# Patient Record
Sex: MALE | URBAN - METROPOLITAN AREA
[De-identification: names, ages, dates, MRNs, and addresses within clinical notes are randomized per-mention and may not be internally consistent; named-entity substitution may affect disease eponyms.]

---

## 2022-01-27 ENCOUNTER — HOSPITAL ENCOUNTER (OUTPATIENT)
Age: 77
Discharge: HOME | End: 2022-01-29

## 2022-01-27 PROCEDURE — 88305 TISSUE EXAM BY PATHOLOGIST: CPT

## 2022-05-26 ENCOUNTER — HOSPITAL ENCOUNTER (OUTPATIENT)
Age: 77
Discharge: HOME | End: 2022-05-28

## 2022-05-26 PROCEDURE — 88305 TISSUE EXAM BY PATHOLOGIST: CPT

## 2022-05-26 PROCEDURE — 88331 PATH CONSLTJ SURG 1 BLK 1SPC: CPT

## 2025-01-17 ENCOUNTER — OFFICE VISIT (OUTPATIENT)
Dept: UROLOGY | Facility: HOSPITAL | Age: 80
End: 2025-01-17
Payer: MEDICARE

## 2025-01-17 DIAGNOSIS — R97.20 ELEVATED PSA: ICD-10-CM

## 2025-01-17 DIAGNOSIS — N40.1 BENIGN PROSTATIC HYPERPLASIA WITH INCOMPLETE BLADDER EMPTYING: Primary | ICD-10-CM

## 2025-01-17 DIAGNOSIS — R39.14 BENIGN PROSTATIC HYPERPLASIA WITH INCOMPLETE BLADDER EMPTYING: Primary | ICD-10-CM

## 2025-01-17 PROCEDURE — 51798 US URINE CAPACITY MEASURE: CPT | Performed by: UROLOGY

## 2025-01-17 PROCEDURE — 1036F TOBACCO NON-USER: CPT | Performed by: UROLOGY

## 2025-01-17 PROCEDURE — G2211 COMPLEX E/M VISIT ADD ON: HCPCS | Performed by: UROLOGY

## 2025-01-17 PROCEDURE — 1159F MED LIST DOCD IN RCRD: CPT | Performed by: UROLOGY

## 2025-01-17 PROCEDURE — 99214 OFFICE O/P EST MOD 30 MIN: CPT | Mod: 25 | Performed by: UROLOGY

## 2025-01-17 PROCEDURE — 99204 OFFICE O/P NEW MOD 45 MIN: CPT | Performed by: UROLOGY

## 2025-01-17 ASSESSMENT — ENCOUNTER SYMPTOMS
DEPRESSION: 0
OCCASIONAL FEELINGS OF UNSTEADINESS: 0
LOSS OF SENSATION IN FEET: 0

## 2025-01-17 ASSESSMENT — PATIENT HEALTH QUESTIONNAIRE - PHQ9
1. LITTLE INTEREST OR PLEASURE IN DOING THINGS: NOT AT ALL
SUM OF ALL RESPONSES TO PHQ9 QUESTIONS 1 AND 2: 0
2. FEELING DOWN, DEPRESSED OR HOPELESS: NOT AT ALL

## 2025-02-13 ENCOUNTER — HOSPITAL ENCOUNTER (OUTPATIENT)
Dept: RADIOLOGY | Facility: CLINIC | Age: 80
Discharge: HOME | End: 2025-02-13
Payer: MEDICARE

## 2025-02-13 DIAGNOSIS — R97.20 ELEVATED PSA: ICD-10-CM

## 2025-02-13 PROCEDURE — 2550000001 HC RX 255 CONTRASTS: Performed by: UROLOGY

## 2025-02-13 PROCEDURE — 76498 UNLISTED MR PROCEDURE: CPT

## 2025-02-13 PROCEDURE — A9575 INJ GADOTERATE MEGLUMI 0.1ML: HCPCS | Performed by: UROLOGY

## 2025-02-13 PROCEDURE — 72197 MRI PELVIS W/O & W/DYE: CPT

## 2025-02-13 RX ORDER — GADOTERATE MEGLUMINE 376.9 MG/ML
0.2 INJECTION INTRAVENOUS
Status: COMPLETED | OUTPATIENT
Start: 2025-02-13 | End: 2025-02-13

## 2025-02-13 RX ADMIN — GADOTERATE MEGLUMINE 18 ML: 376.9 INJECTION INTRAVENOUS at 09:35

## 2025-02-20 ENCOUNTER — APPOINTMENT (OUTPATIENT)
Dept: UROLOGY | Facility: HOSPITAL | Age: 80
End: 2025-02-20
Payer: MEDICARE

## 2025-02-20 DIAGNOSIS — R97.20 ELEVATED PSA: ICD-10-CM

## 2025-02-20 PROCEDURE — 99213 OFFICE O/P EST LOW 20 MIN: CPT | Performed by: UROLOGY

## 2025-02-20 PROCEDURE — G2211 COMPLEX E/M VISIT ADD ON: HCPCS | Performed by: UROLOGY

## 2025-02-20 NOTE — PROGRESS NOTES
HPI    79 y.o. male being seen with the following problem list:    Problem list:  BPH with obstruction  Elevated PSA  ED     11/5/24 PVR over 400cc     Prostate bx 2003 - neg     PSA  10/2024 - 45.1  4/2024 - 41.5  4/2023 - 36.3  4/2022 - 29.7  10/2021 - 32.6  3/2014 -16.2  1/2013 - 9.4     01/17/25 - PVR 239cc. Incomplete emptying. He has been on flomax, finasteride for a few months now. Feels his symptoms improved on medication. Not bothered by his urination.     2/13/25 MR prostate  A PI-RADS 3 lesion in the left lateral anterior base transitional zone. No definite signs of gross extracapsular extension. No evidence of enlarged pelvic lymph nodes.  -Prostate vol: 154.22g. PSAd 0.29 ng/mL/g.     02/20/25 - Seen today over telehealth, performed this visit using real-time telehealth tools, including an audio/video connection between Kyle Chin at home and Jesse Killian MD at [facility]. Consent for telemedicine visit was obtained.   Voiding well, no issues or concerns at this point. Here to review MRI.          Current Medications:  No current outpatient medications on file.     No current facility-administered medications for this visit.        Active Problems:  Kyle Chin is a 79 y.o. male with the following Problems and Medications.  There is no problem list on file for this patient.    No current outpatient medications on file.     No current facility-administered medications for this visit.       PMH:  No past medical history on file.    PSH:  No past surgical history on file.    FMH:  No family history on file.    SHx:  Social History     Tobacco Use    Smoking status: Never    Smokeless tobacco: Never       Allergies:  No Known Allergies      Assessment/Plan  We reviewed his MRI showing PiRADS 3 lesions, indeterminate. No evidence of a rapidly worsening process that would warrant further evaluation at this time in context of his PSA stability, prior bx, age. Challenge is that PSA has been  profoundly elevated, hard to use as a marker to trend. For now, we will repeat PSA in 4 months.     FU in 4 months with PSA prior     Scribe Attestation  By signing my name below, I, Olman Hayes, attest that this documentation has been prepared under the direction and in the presence of Jesse Killian MD.

## 2025-05-20 DIAGNOSIS — R97.20 ELEVATED PSA: ICD-10-CM

## 2025-07-02 LAB — PSA SERPL-MCNC: 18.35 NG/ML

## 2025-07-15 NOTE — PROGRESS NOTES
HPI    80 y.o. male being seen with the following problem list:    Problem list:  BPH with obstruction  Elevated PSA  ED     11/5/24 PVR over 400cc     Prostate bx 2003 - neg     PSA  10/2024 - 45.1  4/2024 - 41.5  4/2023 - 36.3  4/2022 - 29.7  10/2021 - 32.6  3/2014 -16.2  1/2013 - 9.4     01/17/25 - PVR 239cc. Incomplete emptying. He has been on flomax, finasteride for a few months now. Feels his symptoms improved on medication. Not bothered by his urination.      2/13/25 MR prostate  A PI-RADS 3 lesion in the left lateral anterior base transitional zone. No definite signs of gross extracapsular extension. No evidence of enlarged pelvic lymph nodes.  -Prostate vol: 154.22g. PSAd 0.29 ng/mL/g.      02/20/25 - Seen today over telehealth, performed this visit using real-time telehealth tools, including an audio/video connection between Kyle Chin at home and Jesse Killian MD at [facility]. Consent for telemedicine visit was obtained.   Voiding well, no issues or concerns at this point. Here to review MRI.     07/17/25 - Seen today over telehealth, performed this visit using real-time telehealth tools, including an audio/video connection between Kyle Chin at home and Jesse Killian MD at [facility]. Consent for telemedicine visit was obtained.   Urinating well. Here to review PSA      Lab Results   Component Value Date    PSA 18.35 (H) 07/01/2025              Current Medications:  Current Medications[1]     Active Problems:  Kyle Chin is a 80 y.o. male with the following Problems and Medications.  Problem List[2]  Current Medications[3]    PMH:  Medical History[4]    PSH:  Surgical History[5]    FMH:  Family History[6]    SHx:  Social History[7]    Allergies:  RX Allergies[8]      Assessment/Plan  PSA now 18, 36 corrected for finasteride. Similar to levels 2 years prior, though still markedly elevated but he has had a several decade history of ePSA. Recent MRI with just an  indeterminate lesion. Reassuring that PSA dropped appropriately on finasteride. Offered continued screening vs bx vs PSMA PET scan. Given his age, relative Psa stability, MRI that was not worrisome, and appropriate decline on finasteride I think it would be reasonable to just keep an eye on things for now. He agrees with this plan. Will continue with screening in 4-5 months.    Doing well from urinary standpoint, continue meds    FU in 4-5 months over TH with PSA prior    Scribe Attestation  By signing my name below, I, Osmany Jorge, Olman, attest that this documentation has been prepared under the direction and in the presence of Jesse Killian MD.           [1]   No current outpatient medications on file.     No current facility-administered medications for this visit.   [2]   Patient Active Problem List  Diagnosis    Elevated PSA   [3]   No current outpatient medications on file.     No current facility-administered medications for this visit.   [4]   Past Medical History:  Diagnosis Date    Benign prostatic hyperplasia 1991    Elevated PSA 1970    Hypertension    [5] No past surgical history on file.  [6]   Family History  Problem Relation Name Age of Onset    Cancer Mother Bouchra Chin    [7]   Social History  Tobacco Use    Smoking status: Never    Smokeless tobacco: Never   Substance Use Topics    Alcohol use: Yes     Alcohol/week: 2.0 standard drinks of alcohol     Types: 2 Cans of beer per week    Drug use: Never   [8] No Known Allergies

## 2025-07-17 ENCOUNTER — TELEMEDICINE (OUTPATIENT)
Dept: UROLOGY | Facility: HOSPITAL | Age: 80
End: 2025-07-17
Payer: MEDICARE

## 2025-07-17 DIAGNOSIS — R39.14 BENIGN PROSTATIC HYPERPLASIA WITH INCOMPLETE BLADDER EMPTYING: Primary | ICD-10-CM

## 2025-07-17 DIAGNOSIS — N40.1 BENIGN PROSTATIC HYPERPLASIA WITH INCOMPLETE BLADDER EMPTYING: Primary | ICD-10-CM

## 2025-07-17 DIAGNOSIS — R97.20 ELEVATED PSA: ICD-10-CM

## 2025-07-17 PROCEDURE — G2211 COMPLEX E/M VISIT ADD ON: HCPCS | Performed by: UROLOGY

## 2025-07-17 PROCEDURE — 99214 OFFICE O/P EST MOD 30 MIN: CPT | Performed by: UROLOGY
